# Patient Record
Sex: FEMALE | Race: WHITE | NOT HISPANIC OR LATINO | Employment: UNEMPLOYED | URBAN - METROPOLITAN AREA
[De-identification: names, ages, dates, MRNs, and addresses within clinical notes are randomized per-mention and may not be internally consistent; named-entity substitution may affect disease eponyms.]

---

## 2023-08-29 ENCOUNTER — TELEPHONE (OUTPATIENT)
Dept: SURGERY | Facility: CLINIC | Age: 28
End: 2023-08-29

## 2023-08-29 NOTE — TELEPHONE ENCOUNTER
----- Message from Christelle Potts sent at 8/29/2023 12:05 PM CDT -----  Contact: Pt's   Type:  Patient Returning Call    Who Called:  Pt's   Who Left Message for Patient:  Estevan  Does the patient know what this is regarding?:  her hemorrhoid apt  Best Call Back Number:  [phone]  Additional Information:  Please call back to advise. Thanks!

## 2023-08-29 NOTE — TELEPHONE ENCOUNTER
----- Message from Mary Anne Padilla sent at 8/29/2023  9:49 AM CDT -----  Contact: Pt's spouse @ [phone]  Type:  Needs Medical Advice    Who Called: Pt's spouse/ Juaquin  Symptoms (please be specific): Hemorrhoids   How long has patient had these symptoms:  Years    Would the patient rather a call back or a response via [facility]? Call pt's spouse   Best Call Back Number: [phone]  Additional Information: Pt's spouse would like to schedule an appt for pt. Please call pt's spouse back to advise.

## 2023-08-31 NOTE — TELEPHONE ENCOUNTER
LMOR @ 3:30pm for patients  to call back about scheduling an appointment.  Estevan   [facility]